# Patient Record
Sex: MALE | Race: BLACK OR AFRICAN AMERICAN | NOT HISPANIC OR LATINO | URBAN - METROPOLITAN AREA
[De-identification: names, ages, dates, MRNs, and addresses within clinical notes are randomized per-mention and may not be internally consistent; named-entity substitution may affect disease eponyms.]

---

## 2017-09-10 ENCOUNTER — EMERGENCY (EMERGENCY)
Facility: HOSPITAL | Age: 32
LOS: 1 days | End: 2017-09-10
Attending: STUDENT IN AN ORGANIZED HEALTH CARE EDUCATION/TRAINING PROGRAM
Payer: SELF-PAY

## 2017-09-10 LAB
ALBUMIN SERPL ELPH-MCNC: 4.6 G/DL — SIGNIFICANT CHANGE UP (ref 3.3–5.2)
ALP SERPL-CCNC: 114 U/L — SIGNIFICANT CHANGE UP (ref 40–120)
ALT FLD-CCNC: 25 U/L — SIGNIFICANT CHANGE UP
AMYLASE P1 CFR SERPL: 75 U/L — SIGNIFICANT CHANGE UP (ref 36–128)
ANION GAP SERPL CALC-SCNC: 14 MMOL/L — SIGNIFICANT CHANGE UP (ref 5–17)
APTT BLD: 43.2 SEC — HIGH (ref 27.5–37.4)
AST SERPL-CCNC: 21 U/L — SIGNIFICANT CHANGE UP
BASOPHILS # BLD AUTO: 0.1 K/UL — SIGNIFICANT CHANGE UP (ref 0–0.2)
BASOPHILS NFR BLD AUTO: 0.7 % — SIGNIFICANT CHANGE UP (ref 0–2)
BILIRUB SERPL-MCNC: 0.2 MG/DL — LOW (ref 0.4–2)
BLD GP AB SCN SERPL QL: SIGNIFICANT CHANGE UP
BUN SERPL-MCNC: 10 MG/DL — SIGNIFICANT CHANGE UP (ref 8–20)
CALCIUM SERPL-MCNC: 9.2 MG/DL — SIGNIFICANT CHANGE UP (ref 8.6–10.2)
CHLORIDE SERPL-SCNC: 102 MMOL/L — SIGNIFICANT CHANGE UP (ref 98–107)
CO2 SERPL-SCNC: 25 MMOL/L — SIGNIFICANT CHANGE UP (ref 22–29)
CREAT SERPL-MCNC: 1.02 MG/DL — SIGNIFICANT CHANGE UP (ref 0.5–1.3)
EOSINOPHIL # BLD AUTO: 0.3 K/UL — SIGNIFICANT CHANGE UP (ref 0–0.5)
EOSINOPHIL NFR BLD AUTO: 1.8 % — SIGNIFICANT CHANGE UP (ref 0–6)
ETHANOL SERPL-MCNC: <10 MG/DL — SIGNIFICANT CHANGE UP
GLUCOSE SERPL-MCNC: 99 MG/DL — SIGNIFICANT CHANGE UP (ref 70–115)
HCT VFR BLD CALC: 45.6 % — SIGNIFICANT CHANGE UP (ref 42–52)
HGB BLD-MCNC: 15.4 G/DL — SIGNIFICANT CHANGE UP (ref 14–18)
INR BLD: 0.96 RATIO — SIGNIFICANT CHANGE UP (ref 0.88–1.16)
LIDOCAIN IGE QN: 19 U/L — LOW (ref 22–51)
LYMPHOCYTES # BLD AUTO: 18.3 % — LOW (ref 20–55)
LYMPHOCYTES # BLD AUTO: 2.8 K/UL — SIGNIFICANT CHANGE UP (ref 1–4.8)
MCHC RBC-ENTMCNC: 27.8 PG — SIGNIFICANT CHANGE UP (ref 27–31)
MCHC RBC-ENTMCNC: 33.8 G/DL — SIGNIFICANT CHANGE UP (ref 32–36)
MCV RBC AUTO: 82.5 FL — SIGNIFICANT CHANGE UP (ref 80–94)
MONOCYTES # BLD AUTO: 0.9 K/UL — HIGH (ref 0–0.8)
MONOCYTES NFR BLD AUTO: 5.9 % — SIGNIFICANT CHANGE UP (ref 3–10)
NEUTROPHILS # BLD AUTO: 11.1 K/UL — HIGH (ref 1.8–8)
NEUTROPHILS NFR BLD AUTO: 73.1 % — HIGH (ref 37–73)
PLATELET # BLD AUTO: 168 K/UL — SIGNIFICANT CHANGE UP (ref 150–400)
POTASSIUM SERPL-MCNC: 4 MMOL/L — SIGNIFICANT CHANGE UP (ref 3.5–5.3)
POTASSIUM SERPL-SCNC: 4 MMOL/L — SIGNIFICANT CHANGE UP (ref 3.5–5.3)
PROT SERPL-MCNC: 7.4 G/DL — SIGNIFICANT CHANGE UP (ref 6.6–8.7)
PROTHROM AB SERPL-ACNC: 10.6 SEC — SIGNIFICANT CHANGE UP (ref 9.8–12.7)
RBC # BLD: 5.53 M/UL — SIGNIFICANT CHANGE UP (ref 4.6–6.2)
RBC # FLD: 14.9 % — SIGNIFICANT CHANGE UP (ref 11–15.6)
SODIUM SERPL-SCNC: 141 MMOL/L — SIGNIFICANT CHANGE UP (ref 135–145)
TYPE + AB SCN PNL BLD: SIGNIFICANT CHANGE UP
WBC # BLD: 15.2 K/UL — HIGH (ref 4.8–10.8)
WBC # FLD AUTO: 15.2 K/UL — HIGH (ref 4.8–10.8)

## 2017-09-10 PROCEDURE — 71010: CPT | Mod: 26

## 2017-09-10 PROCEDURE — 83690 ASSAY OF LIPASE: CPT

## 2017-09-10 PROCEDURE — 80053 COMPREHEN METABOLIC PANEL: CPT

## 2017-09-10 PROCEDURE — 96374 THER/PROPH/DIAG INJ IV PUSH: CPT | Mod: XU

## 2017-09-10 PROCEDURE — 71260 CT THORAX DX C+: CPT | Mod: 26

## 2017-09-10 PROCEDURE — 70450 CT HEAD/BRAIN W/O DYE: CPT | Mod: 26

## 2017-09-10 PROCEDURE — 71260 CT THORAX DX C+: CPT

## 2017-09-10 PROCEDURE — 85730 THROMBOPLASTIN TIME PARTIAL: CPT

## 2017-09-10 PROCEDURE — 86850 RBC ANTIBODY SCREEN: CPT

## 2017-09-10 PROCEDURE — 72125 CT NECK SPINE W/O DYE: CPT | Mod: 26

## 2017-09-10 PROCEDURE — 85610 PROTHROMBIN TIME: CPT

## 2017-09-10 PROCEDURE — 86901 BLOOD TYPING SEROLOGIC RH(D): CPT

## 2017-09-10 PROCEDURE — 86900 BLOOD TYPING SEROLOGIC ABO: CPT

## 2017-09-10 PROCEDURE — 74177 CT ABD & PELVIS W/CONTRAST: CPT | Mod: 26

## 2017-09-10 PROCEDURE — 99285 EMERGENCY DEPT VISIT HI MDM: CPT | Mod: 25

## 2017-09-10 PROCEDURE — 99291 CRITICAL CARE FIRST HOUR: CPT

## 2017-09-10 PROCEDURE — 36415 COLL VENOUS BLD VENIPUNCTURE: CPT

## 2017-09-10 PROCEDURE — 82150 ASSAY OF AMYLASE: CPT

## 2017-09-10 PROCEDURE — 99285 EMERGENCY DEPT VISIT HI MDM: CPT

## 2017-09-10 PROCEDURE — 80307 DRUG TEST PRSMV CHEM ANLYZR: CPT

## 2017-09-10 PROCEDURE — 72125 CT NECK SPINE W/O DYE: CPT

## 2017-09-10 PROCEDURE — 74177 CT ABD & PELVIS W/CONTRAST: CPT

## 2017-09-10 PROCEDURE — 85027 COMPLETE CBC AUTOMATED: CPT

## 2017-09-10 PROCEDURE — 70450 CT HEAD/BRAIN W/O DYE: CPT

## 2017-09-10 PROCEDURE — 71045 X-RAY EXAM CHEST 1 VIEW: CPT

## 2017-09-10 PROCEDURE — 83605 ASSAY OF LACTIC ACID: CPT
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
## 2017-09-10 NOTE — ED PROVIDER NOTE - PROGRESS NOTE DETAILS
Patient significant in the ED to pick patient up. Patient states he has back pain but feels a little better. He is requesting immediate discharge. He states he needs to get home to take care of personal issues and will no longer wait. trauma aware but unable to evaluate the patient immediately. Results reviewed briefly with patient.  patient left his name and number. Pt understands and recalls risks of leaving against medical advice, including death from undiagnosed traumatic injury. Pt states that pt will see PMD. Pt advised to return to the ED if pt feels worse.
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
## 2017-09-10 NOTE — H&P ADULT - NSHPPHYSICALEXAM_GEN_ALL_CORE
PRIMARY:  A: airway intact, speaking full sentences  B: equal bilateral breath sounds, CTA  C: +2 central pulses, no external hemorrhage  D: GCS 15, pupils 3mm, no deformities, no stepoffs  E: full exposure obtained      SECONDARY:  Appearance: NAD, c-collar intact	  HEENT:   GCS 15, 3mm, PERRL, EOMI, NC/AT  Chest: S1, S2, No JVD, no tracheal deviation, b/l breath sounds, CTA  Abdomen:  Soft, small ecchymosis to right of umbilicus, nondistended  Skin: left elbow abrasions  Neurologic:  A&Ox3, no sensory or motor deficits  Extremities: Normal range of motion, strength 5/5 in all extremities  Vascular: Peripheral pulses palpable 2+ bilaterally
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
## 2017-09-10 NOTE — CHART NOTE - NSCHARTNOTEFT_GEN_A_CORE
Received call from ED stating patient was agitated, pulling out his IV lines and taking off his c-spine collar. Nurse stated patient was requesting to leave. Immediately went down to the ED to try to discuss with the patient the results of his scans and the need to perform a tertiary survey. Patient had already walked out of the ED by the time I arrived.
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
## 2017-09-11 NOTE — ED ADULT NURSE NOTE - NS ED NURSE ELOPE COMMENTS
Pt. removed own c-collar and IV. family and pt requesting to leave. family becoming increasingly agitated. MD Ibanez and Surgery Team made aware, attempts to get pt. to stay disregarded. informed of risks to leaving  without proper workup.

## 2019-07-17 NOTE — ED PROVIDER NOTE - HISTORY ATTESTATION, MLM
Refill requested for:       Disp Refills Start End    aliskiren (TEKTURNA) 300 MG tablet 90 tablet 1 12/19/2018     Sig - Route: Take 1 tablet by mouth daily. - Oral    Class: Script Not Printed          Last office visit:    12/12/2018         Physician authorization required.     I have reviewed and confirmed nurses' notes...

## 2019-12-31 NOTE — ED PROVIDER NOTE - AGGRAVATING FACTORS
Well Visit, Men 48 to 72: Care Instructions  Your Care Instructions    Physical exams can help you stay healthy. Your doctor has checked your overall health and may have suggested ways to take good care of yourself. He or she also may have recommended tests. At home, you can help prevent illness with healthy eating, regular exercise, and other steps. Follow-up care is a key part of your treatment and safety. Be sure to make and go to all appointments, and call your doctor if you are having problems. It's also a good idea to know your test results and keep a list of the medicines you take. How can you care for yourself at home? · Reach and stay at a healthy weight. This will lower your risk for many problems, such as obesity, diabetes, heart disease, and high blood pressure. · Get at least 30 minutes of exercise on most days of the week. Walking is a good choice. You also may want to do other activities, such as running, swimming, cycling, or playing tennis or team sports. · Do not smoke. Smoking can make health problems worse. If you need help quitting, talk to your doctor about stop-smoking programs and medicines. These can increase your chances of quitting for good. · Protect your skin from too much sun. When you're outdoors from 10 a.m. to 4 p.m., stay in the shade or cover up with clothing and a hat with a wide brim. Wear sunglasses that block UV rays. Even when it's cloudy, put broad-spectrum sunscreen (SPF 30 or higher) on any exposed skin. · See a dentist one or two times a year for checkups and to have your teeth cleaned. · Wear a seat belt in the car. Follow your doctor's advice about when to have certain tests. These tests can spot problems early. · Cholesterol. Your doctor will tell you how often to have this done based on your overall health and other things that can increase your risk for heart attack and stroke. · Blood pressure.  Have your blood pressure checked during a routine doctor visit. Your doctor will tell you how often to check your blood pressure based on your age, your blood pressure results, and other factors. · Prostate exam. Talk to your doctor about whether you should have a blood test (called a PSA test) for prostate cancer. Experts recommend that you discuss the benefits and risks of the test with your doctor before you decide whether to have this test.  · Diabetes. Ask your doctor whether you should have tests for diabetes. · Vision. Some experts recommend that you have yearly exams for glaucoma and other age-related eye problems starting at age 48. · Hearing. Tell your doctor if you notice any change in your hearing. You can have tests to find out how well you hear. · Colorectal cancer. Your risk for colorectal cancer gets higher as you get older. Some experts say that adults should start regular screening at age 48 and stop at age 76. Others say to start before age 48 or continue after age 76. Talk with your doctor about your risk and when to start and stop screening. · Heart attack and stroke risk. At least every 4 to 6 years, you should have your risk for heart attack and stroke assessed. Your doctor uses factors such as your age, blood pressure, cholesterol, and whether you smoke or have diabetes to show what your risk for a heart attack or stroke is over the next 10 years. · Abdominal aortic aneurysm. Ask your doctor whether you should have a test to check for an aneurysm. You may need a test if you ever smoked or if your parent, brother, sister, or child has had an aneurysm. When should you call for help? Watch closely for changes in your health, and be sure to contact your doctor if you have any problems or symptoms that concern you. Where can you learn more? Go to http://ad-jeremías.info/. Enter R361 in the search box to learn more about \"Well Visit, Men 48 to 72: Care Instructions. \"  Current as of: December 13, 2018  Content Version: 12.2  © 4804-1387 [employer]. Care instructions adapted under license by [employer] (which disclaims liability or warranty for this information). If you have questions about a medical condition or this instruction, always ask your healthcare professional. [address] any warranty or liability for your use of this information. none
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
